# Patient Record
Sex: FEMALE | URBAN - METROPOLITAN AREA
[De-identification: names, ages, dates, MRNs, and addresses within clinical notes are randomized per-mention and may not be internally consistent; named-entity substitution may affect disease eponyms.]

---

## 2021-06-28 ENCOUNTER — OFFICE VISIT (OUTPATIENT)
Dept: URBAN - METROPOLITAN AREA CLINIC 27 | Facility: CLINIC | Age: 76
End: 2021-06-28
Payer: MEDICARE

## 2021-06-28 DIAGNOSIS — H25.13 AGE-RELATED NUCLEAR CATARACT, BILATERAL: ICD-10-CM

## 2021-06-28 DIAGNOSIS — H34.12 CENTRAL RETINAL ARTERY OCCLUSION, LEFT EYE: Primary | ICD-10-CM

## 2021-06-28 PROCEDURE — 92235 FLUORESCEIN ANGRPH MLTIFRAME: CPT | Performed by: OPHTHALMOLOGY

## 2021-06-28 PROCEDURE — 92134 CPTRZ OPH DX IMG PST SGM RTA: CPT | Performed by: OPHTHALMOLOGY

## 2021-06-28 PROCEDURE — 92250 FUNDUS PHOTOGRAPHY W/I&R: CPT | Performed by: OPHTHALMOLOGY

## 2021-06-28 PROCEDURE — 99203 OFFICE O/P NEW LOW 30 MIN: CPT | Performed by: OPHTHALMOLOGY

## 2021-06-28 ASSESSMENT — INTRAOCULAR PRESSURE
OS: 12
OD: 13

## 2021-06-28 NOTE — IMPRESSION/PLAN
Impression: Age-related nuclear cataract, bilateral: H25.13. Plan: Dense.   OK for CE/IOL OU (CE OS would be for more accurate retinal exams in future)

## 2021-06-28 NOTE — IMPRESSION/PLAN
Impression: Central retinal artery occlusion, left eye: H34.12. Plan: Pt c/o acute painless LOV OS x4 days. Exam/photos show NFL edema. 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 Pt has already been to ED and had a full stroke w/u. She denies all GCA Sxs. Recommend BP control. Will monitor. 

3 months, OCT OU